# Patient Record
Sex: MALE | Race: WHITE | Employment: FULL TIME | URBAN - METROPOLITAN AREA
[De-identification: names, ages, dates, MRNs, and addresses within clinical notes are randomized per-mention and may not be internally consistent; named-entity substitution may affect disease eponyms.]

---

## 2020-02-14 ENCOUNTER — TRANSFERRED RECORDS (OUTPATIENT)
Dept: HEALTH INFORMATION MANAGEMENT | Facility: CLINIC | Age: 65
End: 2020-02-14

## 2020-02-25 ENCOUNTER — TRANSFERRED RECORDS (OUTPATIENT)
Dept: HEALTH INFORMATION MANAGEMENT | Facility: CLINIC | Age: 65
End: 2020-02-25

## 2020-03-10 NOTE — TELEPHONE ENCOUNTER
RECORDS RECEIVED FROM: External   Date of Appt: 4/24/20   NOTES (FOR ALL VISITS) STATUS DETAILS   OFFICE NOTE from referring provider [facility]  2/25/20 1/30/20    OFFICE NOTE from other specialist N/A    DISCHARGE SUMMARY from hospital N/A    DISCHARGE REPORT from the ER N/A    OPERATIVE REPORT N/A    MEDICATION LIST Received    IMAGING  (FOR ALL VISITS)     EMG N/A    EEG N/A    MRI (HEAD, NECK, SPINE) Received [facility] [place]:  MRI Brain 2/14/20   LUMBAR PUNCTURE N/A    STEPHON Scan N/A    CT (HEAD, NECK, SPINE) N/A       Action 3/10/20 MV 1.59pm   Action Taken Imaging request faxed to [facility] [place]:  MRI Brain 2/14/20     Imaging Received  3/18/20 MV 12.25pm  [facility] [place]   Image Type (x): Disc: x  PACS:    Exam Date/Name MRI Brain 2/14/20 Comments: imaging disk received from [facility] via mail. Sent to PACS upload

## 2020-04-17 ENCOUNTER — TELEPHONE (OUTPATIENT)
Dept: NEUROLOGY | Facility: CLINIC | Age: 65
End: 2020-04-17

## 2020-04-17 NOTE — TELEPHONE ENCOUNTER
Called pt and informed him that his appointment for April 24 with Cecilio Tomas and Dr. garcía has been cancelled due to the coronavirus pandemic. He was told he will be called back when dr. garcía is again seeing pt's in the clinic.

## 2020-04-24 ENCOUNTER — PRE VISIT (OUTPATIENT)
Dept: NEUROLOGY | Facility: CLINIC | Age: 65
End: 2020-04-24

## 2020-05-19 ENCOUNTER — TELEPHONE (OUTPATIENT)
Dept: NEUROLOGY | Facility: CLINIC | Age: 65
End: 2020-05-19

## 2020-05-19 NOTE — TELEPHONE ENCOUNTER
Pt called and left a message that he would not be taking the clinic visit appointment on May 22 with Dr. Aguilar. He wants to wait until he can see the doctor in person. Called pt and had to leave a message that this information has been received and he will be called again to schedule another appointment.

## 2020-09-04 ENCOUNTER — TELEPHONE (OUTPATIENT)
Dept: NEUROLOGY | Facility: CLINIC | Age: 65
End: 2020-09-04

## 2020-09-04 NOTE — TELEPHONE ENCOUNTER
Pt called to make an appointment to see DR. Aguilar in the [facility]. He is set up for a visual virtual appointment on Sept 25 at 8am. He has had problems with balance for years but most recently he has increased problems with his speech. He does not hit or kick when he sleeps but he does snore and he has not had a sleep study. His last eye exam was 2 to 3 years ago. He has 3 biological children ages 45,42, and 38. He was made aware that Cecilio Tomas, genetics counselor would call before his Sept 25th appointment to do a family history. He would like the invitation to this appointment be sent to his email address but the phone number to use for the actual appointment would be his wife's cell phone number which is [phone].

## 2020-09-09 ENCOUNTER — TELEPHONE (OUTPATIENT)
Dept: NEUROLOGY | Facility: CLINIC | Age: 65
End: 2020-09-09

## 2020-09-09 NOTE — TELEPHONE ENCOUNTER
GENETIC COUNSELING-Neurology  I left a message for Mely offering times to review family history before his appointment in the ataxia clinic    Cecilio Tomas MS, [facility]  Certified Genetic Counselor

## 2020-09-18 ENCOUNTER — VIRTUAL VISIT (OUTPATIENT)
Dept: NEUROLOGY | Facility: CLINIC | Age: 65
End: 2020-09-18
Payer: COMMERCIAL

## 2020-09-18 DIAGNOSIS — R27.0 ATAXIA: Primary | ICD-10-CM

## 2020-09-18 NOTE — PROGRESS NOTES
"Mely Scales is a 65 year old male who is being evaluated via a billable telephone visit.      The patient has been notified of following:     \"This telephone visit will be conducted via a call between you and your physician/provider. We have found that certain health care needs can be provided without the need for a physical exam.  This service lets us provide the care you need with a short phone conversation.  If a prescription is necessary we can send it directly to your pharmacy.  If lab work is needed we can place an order for that and you can then stop by our lab to have the test done at a later time.    Telephone visits are billed at different rates depending on your insurance coverage. During this emergency period, for some insurers they may be billed the same as an in-person visit.  Please reach out to your insurance provider with any questions.    If during the course of the call the physician/provider feels a telephone visit is not appropriate, you will not be charged for this service.\"    Patient has given verbal consent for Telephone visit? Yes    What phone number would you like to be contacted at? [phone]    How would you like to obtain your AVS?    Phone call duration: 30 minutes    GENETIC COUNSELING-Ataxia clinic  I met with Mely for a 30 minute telephone consult to obtain family history prior to his visit next week to the ataxia clinic.  He was accompanied by his wife, rommel.    MEDICAL HISTORY  Mely reports that he has had a 'balance issue' dating back about 30 years.  He did not pay much attention to this until the past year when he began to also notice a significant decline in his speech and progression of his balance issues. He was diagnosed by a local physician recently with ataxia.    FAMILY HISTORY-see scanned pedigree  1. Mely has three healthy adult children.  2. Mely has 4 healthy siblings  3. Mely's father  at age 82 with Parkinson disease.  4. Mely's maternal grandfather  in his " 80's and had a significant hand tremor. From Mely's description, it sounded like an action tremor that was apparent when he used his hand.    GENETIC COUNSELING-  We discussed the genetic and environmental basis of ataxia. I explained that in most cases, it results from complex and lifelong interaction of genetic and environmental factors. In some cases, there may be a single gene cause. I explained that in Mely's case, the fact that he has had symptoms for several decades suggests the possibility of a genetic basis. I explained that I would review the family history with our physician and if genetic testing is considered next week, then I will discuss this with him further.    Cecilio Tomas MS, [facility]  Certified Genetic Counselor

## 2020-09-18 NOTE — LETTER
Date:September 22, 2020      Patient was self referred, no letter generated. Do not send.        [facility] Health Information

## 2020-09-18 NOTE — LETTER
"9/18/2020       RE: Mely Scales  [address] [address]     Dear Colleague,    Thank you for referring your patient, Mely Scales, to the [facility] NEUROLOGY at [facility]. Please see a copy of my visit note below.    Mely Scales is a 65 year old male who is being evaluated via a billable telephone visit.      The patient has been notified of following:     \"This telephone visit will be conducted via a call between you and your physician/provider. We have found that certain health care needs can be provided without the need for a physical exam.  This service lets us provide the care you need with a short phone conversation.  If a prescription is necessary we can send it directly to your pharmacy.  If lab work is needed we can place an order for that and you can then stop by our lab to have the test done at a later time.    Telephone visits are billed at different rates depending on your insurance coverage. During this emergency period, for some insurers they may be billed the same as an in-person visit.  Please reach out to your insurance provider with any questions.    If during the course of the call the physician/provider feels a telephone visit is not appropriate, you will not be charged for this service.\"    Patient has given verbal consent for Telephone visit? Yes    What phone number would you like to be contacted at? [phone]    How would you like to obtain your AVS?    Phone call duration: 30 minutes    GENETIC COUNSELING-Ataxia clinic  I met with Mely for a 30 minute telephone consult to obtain family history prior to his visit next week to the ataxia clinic.  He was accompanied by his wife, rommel.    MEDICAL HISTORY  Mely reports that he has had a 'balance issue' dating back about 30 years.  He did not pay much attention to this until the past year when he began to also notice a significant decline in his speech and progression of his balance " issues. He was diagnosed by a local physician recently with ataxia.    FAMILY HISTORY-see scanned pedigree  1. Mely has three healthy adult children.  2. Mely has 4 healthy siblings  3. Mely's father  at age 82 with Parkinson disease.  4. Mely's maternal grandfather  in his 80's and had a significant hand tremor. From Mely's description, it sounded like an action tremor that was apparent when he used his hand.    GENETIC COUNSELING-  We discussed the genetic and environmental basis of ataxia. I explained that in most cases, it results from complex and lifelong interaction of genetic and environmental factors. In some cases, there may be a single gene cause. I explained that in Mely's case, the fact that he has had symptoms for several decades suggests the possibility of a genetic basis. I explained that I would review the family history with our physician and if genetic testing is considered next week, then I will discuss this with him further.    Cecilio Tomas MS, [facility]  Certified Genetic Counselor        Again, thank you for allowing me to participate in the care of your patient.      Sincerely,    Matthew Tomas GC
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
## 2020-09-25 ENCOUNTER — VIRTUAL VISIT (OUTPATIENT)
Dept: NEUROLOGY | Facility: CLINIC | Age: 65
End: 2020-09-25
Payer: COMMERCIAL

## 2020-09-25 DIAGNOSIS — R27.0 ATAXIA: Primary | ICD-10-CM
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
## 2020-09-25 NOTE — PROGRESS NOTES
"Mely Scales is a 65 year old male who is being evaluated via a billable video visit.      The patient has been notified of following:     \"This video visit will be conducted via a call between you and your physician/provider. We have found that certain health care needs can be provided without the need for an in-person physical exam.  This service lets us provide the care you need with a video conversation.  If a prescription is necessary we can send it directly to your pharmacy.  If lab work is needed we can place an order for that and you can then stop by our lab to have the test done at a later time.    Video visits are billed at different rates depending on your insurance coverage.  Please reach out to your insurance provider with any questions.    If during the course of the call the physician/provider feels a video visit is not appropriate, you will not be charged for this service.\"    Patient has given verbal consent for Video visit? Yes  How would you like to obtain your AVS? Mail a copy  If you are dropped from the video visit, the video invite should be resent to: Text to cell phone: [phone] USE DOXIMITY  Will anyone else be joining your video visit? No            "

## 2020-10-02 ENCOUNTER — TELEPHONE (OUTPATIENT)
Dept: NEUROLOGY | Facility: CLINIC | Age: 65
End: 2020-10-02

## 2020-10-02 NOTE — TELEPHONE ENCOUNTER
----- Message from Anusha Mata RN sent at 10/2/2020 10:24 AM CDT -----  Hi again,  Mely had a virtual appointment last Friday at which time they wrote orders for 6 lab tests that need to be done.  Would you please send copies of those 6 labs along with Mely's demographics to    [facility]  [place], [place]  [phone]      PLease ask them to fax the results back to .    Thanks,  Julia      Oh and would you please fax a copy of Mely's clinic notes of Dr. Aguilar and Cecilio Tomas to his primary doctor      Dr. Amie Levine, fax [phone].  Thank you

## 2020-10-23 ENCOUNTER — TELEPHONE (OUTPATIENT)
Dept: NEUROLOGY | Facility: CLINIC | Age: 65
End: 2020-10-23

## 2020-10-23 NOTE — TELEPHONE ENCOUNTER
----- Message from Anusha Mata RN sent at 10/23/2020 10:11 AM CDT -----  Good morning,   Mely had an appointment several weeks ago and an MRA of the brain was ordered. I don't know why  completed is typed in the status column because it has not been. Mely called and the [facility] in [place] never received the order, probably because I forgot to ask you two to send it, oh brother.    So my request is this  Please fax the order and the demographics to   [facility]  [place], [place]  [phone]    Ask them to fax the radiology report and mail a disc to Dr. Aguilar when they are finished.  Thanks so much.    Julia

## 2020-10-29 ENCOUNTER — DOCUMENTATION ONLY (OUTPATIENT)
Dept: NEUROLOGY | Facility: CLINIC | Age: 65
End: 2020-10-29

## 2020-10-29 NOTE — PROGRESS NOTES
Action 10.29.20 MJ   Action Taken Received CD from [facility] with 2.14.20 MRI brain. Sent to  for processing.

## 2020-11-03 NOTE — PROGRESS NOTES
I have personally interviewed . Mely Scales and agree with diagnosis and management. The total time spent with the patient was 45 minutes, over 50% of the time spent in counseling and coordinating care.

## 2021-01-04 ENCOUNTER — HEALTH MAINTENANCE LETTER (OUTPATIENT)
Age: 66
End: 2021-01-04

## 2021-06-18 ENCOUNTER — TELEPHONE (OUTPATIENT)
Dept: NEUROLOGY | Facility: CLINIC | Age: 66
End: 2021-06-18
Payer: MEDICARE

## 2021-06-18 NOTE — TELEPHONE ENCOUNTER
Pt called and said he is now on medicare and he wants to have his MRI done at [facility] in [place], [place]. He said he will have his blood work done at the clinic in [place] and they have the orders. He asked that the order for the MRI be sent to [facility] in [place]. He also made a face to face clinic visit with Dr. Aguilar on July 9 at 8:30am.

## 2021-06-23 ENCOUNTER — TELEPHONE (OUTPATIENT)
Dept: NEUROLOGY | Facility: CLINIC | Age: 66
End: 2021-06-23

## 2021-06-23 NOTE — TELEPHONE ENCOUNTER
Health Call Center    Phone Message    May a detailed message be left on voicemail: yes     Reason for Call: Other: Nany calling from [facility] in [place] looking to speak with someone on Dr. Madden's care team in regards to orders for patient. States that patient is scheduled for tomorrow morning at 7:00 a.m for MRA brain, states that orders are for with and without and states that their protocols onsite there is just without contrast and is looking for verbal consent to do it without contrast before tomorrow morning.     Please advise and call Nany back ASA to discuss further     Action Taken: Other: [facility]    Travel Screening: Not Applicable

## 2021-06-24 ENCOUNTER — TRANSFERRED RECORDS (OUTPATIENT)
Dept: HEALTH INFORMATION MANAGEMENT | Facility: CLINIC | Age: 66
End: 2021-06-24

## 2021-06-25 NOTE — CONFIDENTIAL NOTE
Called [facility] in [place]. Read to Nany the notes that the doctor wanted an MRA done to check for vascualar malformation. Nany said they did both with and without contrast and it is covered by his insurance.They did not want the clinic notes sent from the physician visit.

## 2021-07-02 ENCOUNTER — TRANSFERRED RECORDS (OUTPATIENT)
Dept: HEALTH INFORMATION MANAGEMENT | Facility: CLINIC | Age: 66
End: 2021-07-02

## 2021-07-08 ASSESSMENT — ENCOUNTER SYMPTOMS
HYPERTENSION: 0
SLEEP DISTURBANCES DUE TO BREATHING: 0
EXERCISE INTOLERANCE: 0
DIZZINESS: 1
DECREASED CONCENTRATION: 0
HEADACHES: 0
SEIZURES: 0
TREMORS: 0
MEMORY LOSS: 0
DEPRESSION: 0
HYPOTENSION: 0
DISTURBANCES IN COORDINATION: 1
PALPITATIONS: 0
SPEECH CHANGE: 1
NERVOUS/ANXIOUS: 1
NUMBNESS: 0
SYNCOPE: 0
TINGLING: 0
PANIC: 0
INSOMNIA: 0
LEG PAIN: 0
LIGHT-HEADEDNESS: 0
WEAKNESS: 0
LOSS OF CONSCIOUSNESS: 0
PARALYSIS: 0
ORTHOPNEA: 0

## 2021-07-09 ENCOUNTER — OFFICE VISIT (OUTPATIENT)
Dept: NEUROLOGY | Facility: CLINIC | Age: 66
End: 2021-07-09
Payer: MEDICARE

## 2021-07-09 VITALS
OXYGEN SATURATION: 95 % | WEIGHT: 269.3 LBS | DIASTOLIC BLOOD PRESSURE: 104 MMHG | RESPIRATION RATE: 16 BRPM | HEART RATE: 80 BPM | SYSTOLIC BLOOD PRESSURE: 156 MMHG

## 2021-07-09 DIAGNOSIS — R27.0 ATAXIA: Primary | ICD-10-CM

## 2021-07-09 PROCEDURE — 99213 OFFICE O/P EST LOW 20 MIN: CPT | Performed by: PSYCHIATRY & NEUROLOGY

## 2021-07-09 RX ORDER — LATANOPROST 50 UG/ML
SOLUTION/ DROPS OPHTHALMIC
COMMUNITY
Start: 2021-06-24

## 2021-07-09 ASSESSMENT — PAIN SCALES - GENERAL: PAINLEVEL: NO PAIN (0)

## 2021-07-09 NOTE — PROGRESS NOTES
Service Date: 2021    Mr. Love return for evaluation today.  He is a 65-year-old man whom we spoke to over the phone several months ago.  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  This has been ongoing since .      On examination, he does have palatal tremor and sustained peak nystagmus on horizontal gaze.  This seems fatigable as a saw it once or twice bilaterally, more on the right side than the left.  Upper and lower extremities showed no dysmetria.  Strength, reflexes, sensory examination was unremarkable.  He could stand with the feet together; however, could not  tandem.  His gait was wide-based and ataxic.  Sensory examination was unremarkable.  Reflexes were present except for ankle reflexes, which were absent.  Plantars were downgoing.  Vibratory sensation was normal at the toes, more than 10 seconds.  Romberg sign was negative.  His speech was intelligible, but clearly dysarthric.    I did explain to Mr. 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  This has not been a progressive condition.  It is possible that started in  with a stroke in the brainstem or vascular abnormality.  In fact, we did obtain an MRI angiogram, which was done about a month ago, which was negative.  I advised him to take a multivitamin and to take an aspirin a day.  Otherwise, no specific treatment.  He will try to exercise and lose weight and get into a fitness program and I agreed to that.      We will send him the followin.  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  2.  A card indicating that he is not intoxicated, but has ataxia.  3.  A disabled parking permit examination.          Carolina Aguilar MD        D: 2021   T: 2021   MT: YAMILEX    Name:     LUIS LOVE  MRN:      [id number]        Account:      [id number]   :       1955           Service Date: 07/09/2021       Document: [id number]

## 2021-07-09 NOTE — LETTER
7/9/2021       RE: Mely Scales  [address] [place] [address]     Dear Colleague,    Thank you for referring your patient, Mely Scales, to the [facility]. Please see a copy of my visit note below.    I have personally interviewed and examined . Mely Scales and agree with diagnosis and management. The total time spent with the patient was 25 minutes, over 50% of the time spent in counseling and coordinating care.      Service Date: 07/09/2021    Mr. Scales return for evaluation today.  He is a 65-year-old man whom we spoke to over the phone several months ago.  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  This has been ongoing since 1997.      On examination, he does have palatal tremor and sustained peak nystagmus on horizontal gaze.  This seems fatigable as a saw it once or twice bilaterally, more on the right side than the left.  Upper and lower extremities showed no dysmetria.  Strength, reflexes, sensory examination was unremarkable.  He could stand with the feet together; however, could not  tandem.  His gait was wide-based and ataxic.  Sensory examination was unremarkable.  Reflexes were present except for ankle reflexes, which were absent.  Plantars were downgoing.  Vibratory sensation was normal at the toes, more than 10 seconds.  Romberg sign was negative.  His speech was intelligible, but clearly dysarthric.    I did explain to Mr. 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  This has not been a progressive condition.  It is possible that started in 1997 with a stroke in the brainstem or vascular abnormality.  In fact, we did obtain an MRI angiogram, which was done about a month ago, which was negative.  I advised him to take a multivitamin and to take an aspirin a day.  Otherwise, no  specific treatment.  He will try to exercise and lose weight and get into a fitness program and I agreed to that.      We will send him the followin.  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  2.  A card indicating that he is not intoxicated, but has ataxia.  3.  A disabled parking permit examination.          Carolina Aguilar MD        D: 2021   T: 2021   MT: YAMILEX    Name:     LUIS LOVE  MRN:      [id number]        Account:      [id number]   :      1955           Service Date: 2021       Document: [id number]      Again, thank you for allowing me to participate in the care of your patient.      Sincerely,    Carolina Aguilar MD

## 2021-07-09 NOTE — NURSING NOTE
Chief Complaint   Patient presents with     Gait Problem     [facility] RETURN- ataxia     Evelia Almaguer

## 2021-07-24 NOTE — PROGRESS NOTES
I have personally interviewed and examined . Mely Scales and agree with diagnosis and management. The total time spent with the patient was 25 minutes, over 50% of the time spent in counseling and coordinating care.

## 2021-10-10 ENCOUNTER — HEALTH MAINTENANCE LETTER (OUTPATIENT)
Age: 66
End: 2021-10-10

## 2022-01-30 ENCOUNTER — HEALTH MAINTENANCE LETTER (OUTPATIENT)
Age: 67
End: 2022-01-30

## 2022-09-24 ENCOUNTER — HEALTH MAINTENANCE LETTER (OUTPATIENT)
Age: 67
End: 2022-09-24

## 2023-05-08 ENCOUNTER — HEALTH MAINTENANCE LETTER (OUTPATIENT)
Age: 68
End: 2023-05-08

## 2024-07-14 ENCOUNTER — HEALTH MAINTENANCE LETTER (OUTPATIENT)
Age: 69
End: 2024-07-14